# Patient Record
Sex: MALE | Race: WHITE | Employment: UNEMPLOYED | URBAN - METROPOLITAN AREA
[De-identification: names, ages, dates, MRNs, and addresses within clinical notes are randomized per-mention and may not be internally consistent; named-entity substitution may affect disease eponyms.]

---

## 2020-06-23 ENCOUNTER — HOSPITAL ENCOUNTER (EMERGENCY)
Age: 25
Discharge: HOME | End: 2020-06-23
Attending: EMERGENCY MEDICINE

## 2020-06-23 ENCOUNTER — APPOINTMENT (OUTPATIENT)
Dept: GENERAL RADIOLOGY | Age: 25
End: 2020-06-23

## 2020-06-23 VITALS
BODY MASS INDEX: 28.25 KG/M2 | SYSTOLIC BLOOD PRESSURE: 161 MMHG | HEIGHT: 67 IN | OXYGEN SATURATION: 99 % | RESPIRATION RATE: 18 BRPM | DIASTOLIC BLOOD PRESSURE: 95 MMHG | HEART RATE: 82 BPM | TEMPERATURE: 98 F | WEIGHT: 180 LBS

## 2020-06-23 PROCEDURE — 99283 EMERGENCY DEPT VISIT LOW MDM: CPT

## 2020-06-23 PROCEDURE — 73030 X-RAY EXAM OF SHOULDER: CPT

## 2020-06-23 PROCEDURE — 73000 X-RAY EXAM OF COLLAR BONE: CPT

## 2020-06-23 PROCEDURE — 6370000000 HC RX 637 (ALT 250 FOR IP): Performed by: STUDENT IN AN ORGANIZED HEALTH CARE EDUCATION/TRAINING PROGRAM

## 2020-06-23 RX ORDER — OXYCODONE HYDROCHLORIDE 5 MG/1
10 TABLET ORAL ONCE
Status: COMPLETED | OUTPATIENT
Start: 2020-06-23 | End: 2020-06-23

## 2020-06-23 RX ORDER — IBUPROFEN 800 MG/1
800 TABLET ORAL EVERY 8 HOURS PRN
Qty: 30 TABLET | Refills: 0 | Status: SHIPPED | OUTPATIENT
Start: 2020-06-23

## 2020-06-23 RX ORDER — IBUPROFEN 800 MG/1
800 TABLET ORAL ONCE
Status: COMPLETED | OUTPATIENT
Start: 2020-06-23 | End: 2020-06-23

## 2020-06-23 RX ADMIN — OXYCODONE HYDROCHLORIDE 10 MG: 5 TABLET ORAL at 21:29

## 2020-06-23 RX ADMIN — IBUPROFEN 800 MG: 800 TABLET, FILM COATED ORAL at 21:29

## 2020-06-23 ASSESSMENT — PAIN DESCRIPTION - LOCATION
LOCATION: SHOULDER
LOCATION: SHOULDER

## 2020-06-23 ASSESSMENT — ENCOUNTER SYMPTOMS
SHORTNESS OF BREATH: 0
BACK PAIN: 0
NAUSEA: 0
ABDOMINAL PAIN: 0
EYE PAIN: 0
SORE THROAT: 0
VOMITING: 0

## 2020-06-23 ASSESSMENT — PAIN DESCRIPTION - PAIN TYPE
TYPE: ACUTE PAIN
TYPE: ACUTE PAIN

## 2020-06-23 ASSESSMENT — PAIN SCALES - GENERAL
PAINLEVEL_OUTOF10: 7
PAINLEVEL_OUTOF10: 10
PAINLEVEL_OUTOF10: 7

## 2020-06-23 ASSESSMENT — PAIN DESCRIPTION - ORIENTATION
ORIENTATION: LEFT
ORIENTATION: LEFT

## 2020-06-23 ASSESSMENT — PAIN DESCRIPTION - PROGRESSION
CLINICAL_PROGRESSION: GRADUALLY WORSENING
CLINICAL_PROGRESSION: NOT CHANGED

## 2020-06-23 ASSESSMENT — PAIN DESCRIPTION - DESCRIPTORS: DESCRIPTORS: ACHING;SHARP

## 2020-06-23 ASSESSMENT — PAIN DESCRIPTION - ONSET: ONSET: SUDDEN

## 2020-06-23 ASSESSMENT — PAIN DESCRIPTION - FREQUENCY: FREQUENCY: CONTINUOUS

## 2020-06-24 NOTE — ED TRIAGE NOTES
Pt to ER with left shoulder pain and injury s/p fall. Pt states that he tripped over his dog and fell down the steps, -LOC , denies head and neck pain.  Pt is alert and oriented x 4

## 2020-06-24 NOTE — ED PROVIDER NOTES
IMAGING / EKG):  Orders Placed This Encounter   Procedures    XR SHOULDER LEFT (MIN 2 VIEWS)    XR CLAVICLE LEFT    Katrinaing and Keilae    Inpatient consult to Orthopedic Surgery    [address] Manjeet and Ina, Left       MEDICATIONS ORDERED:  Orders Placed This Encounter   Medications    oxyCODONE (ROXICODONE) immediate release tablet 10 mg    ibuprofen (ADVIL;MOTRIN) tablet 800 mg    ibuprofen (ADVIL;MOTRIN) 800 MG tablet     Sig: Take 1 tablet by mouth every 8 hours as needed for Pain     Dispense:  30 tablet     Refill:  0           DIAGNOSTIC RESULTS / EMERGENCY DEPARTMENT COURSE / MDM     LABS:  No results found for this visit on 06/23/20. IMPRESSION/MDM/ED COURSE:  31 y.o. male presented with pain over left clavicle after a dog tripped him and he fell down stairs laying on his left shoulder. Obvious deformity, swelling, ecchymosis to mid left clavicle. Neurovascular intact to upper extremities, vital signs normal.  Likely clavicle fracture, will give pain medications, x-ray. No signs to suggest neuro or vascular injury. Patient/Guardian requesting discharge. Patient/Guardian was given written and verbal instructions prior to discharge. Patient/Guardian understood and agreed. Patient/Guardian had no further questions. RADIOLOGY:  XR SHOULDER LEFT (MIN 2 VIEWS)   Final Result   1. Displaced midshaft left clavicular fracture with overriding of the   fracture fragments. 2. Borderline widening of the left acromioclavicular joint raises concern for   acromioclavicular ligament injury. XR CLAVICLE LEFT   Final Result   1. Displaced midshaft left clavicular fracture with overriding of the   fracture fragments. 2. Borderline widening of the left acromioclavicular joint raises concern for   acromioclavicular ligament injury.                EKG  None    All EKG's are interpreted by the Emergency Department Physician who either signs or Co-signs this chart in the

## 2020-06-25 ENCOUNTER — OFFICE VISIT (OUTPATIENT)
Dept: ORTHOPEDIC SURGERY | Age: 25
End: 2020-06-25

## 2020-06-25 VITALS — HEIGHT: 67 IN | WEIGHT: 165 LBS | BODY MASS INDEX: 25.9 KG/M2

## 2020-06-25 PROCEDURE — 99203 OFFICE O/P NEW LOW 30 MIN: CPT | Performed by: STUDENT IN AN ORGANIZED HEALTH CARE EDUCATION/TRAINING PROGRAM

## 2020-07-02 ENCOUNTER — OFFICE VISIT (OUTPATIENT)
Dept: ORTHOPEDIC SURGERY | Age: 25
End: 2020-07-02

## 2020-07-02 VITALS — BODY MASS INDEX: 25.9 KG/M2 | HEIGHT: 67 IN | WEIGHT: 165 LBS

## 2020-07-02 PROCEDURE — 99212 OFFICE O/P EST SF 10 MIN: CPT | Performed by: STUDENT IN AN ORGANIZED HEALTH CARE EDUCATION/TRAINING PROGRAM

## 2020-07-02 NOTE — PROGRESS NOTES
[facility]  [facility]  [address] [facility] [address]  Dept: [phone]    Ambulatory Orthopedic Office Visit      CHIEF COMPLAINT:    Chief Complaint   Patient presents with    Follow-up     [facility] ER f/u 06/23/20, clavicle  fx     HISTORY OF PRESENT ILLNESS:    The patient is a 31 y.o. male who is being after tripping over his dog down the stairs. Was initially seen in ED on 6/23/20. Last seen on 6/25/20 and non operative management was trialed, patient understood that at that time if fracture displaced he would need further surgery. Presents today for repeat x-rays and treatment options. Has maintained sling. No new injuries. Past Medical History:    History reviewed. No pertinent past medical history. Past Surgical History:    History reviewed. No pertinent surgical history. Current Medications:   Current Outpatient Medications   Medication Sig Dispense Refill    ibuprofen (ADVIL;MOTRIN) 800 MG tablet Take 1 tablet by mouth every 8 hours as needed for Pain 30 tablet 0     No current facility-administered medications for this visit. Allergies:    Amoxicillin  Social History:   Social History     Socioeconomic History    Marital status: Single     Spouse name: Not on file    Number of children: Not on file    Years of education: Not on file    Highest education level: Not on file   Occupational History    Not on file   Social Needs    Financial resource strain: Not on file    Food insecurity     Worry: Not on file     Inability: Not on file    Transportation needs     Medical: Not on file     Non-medical: Not on file   Tobacco Use    Smoking status: Current Every Day Smoker     Packs/day: 1.00     Types: Cigarettes    Smokeless tobacco: Never Used   Substance and Sexual Activity    Alcohol use:  Yes     Alcohol/week: 12.0 standard drinks     Types: 12 Cans of beer per week    Drug use: Never    Sexual activity: Not on file   Lifestyle    Physical Median/Radial/Ulnar/AIN/PIN motor intact. Axillary/Median/Radial/Ulnar nerve sensation intact to light touch. 2+ rad pulse. RADIOLOGY:   History:  Left clavicle fracture    Comparison:  6/23/20    Findings:  2 views of the left clavicle demonstrates mid-distal third clavicle shaft fracture with comminution and increased inferior displacement and shortening since prior films last week     Impression:  Left clavicle fracture with comminution and increased displacement since prior films. X-ray left clavicle on 6/23/20 demonstrates mid-distal third clavicle shaft fracture with comminuted piece. Shortening and medial displacement noted. Assessment/Plan:   Lion Apodaca is a 31y.o. year old male left clavicle fracture    At this time patient is 9 days out at last visit discussed non operative care, today x-rays were taken and fracture was further displaced, meeting operative indications. Discussed surgical vs non operative management with patient. Patient would like to pursue surgery at this time. Plan for surgery on 7/7/20. Consent signed in office today. Non weight bearing, maintain sling. Ok for elbow/wrist/hand ROM. Ice as needed. Tylenol Motrin for pain. All questions answered. Follow up two weeks after surgery.    ----------------------------------------  Concetta Ochoa DO  PGY-3, Department of [facility] [address], [place]

## 2020-07-03 ENCOUNTER — HOSPITAL ENCOUNTER (OUTPATIENT)
Dept: PREADMISSION TESTING | Age: 25
Discharge: HOME | End: 2020-07-07
Payer: OTHER GOVERNMENT

## 2020-07-03 PROCEDURE — U0003 INFECTIOUS AGENT DETECTION BY NUCLEIC ACID (DNA OR RNA); SEVERE ACUTE RESPIRATORY SYNDROME CORONAVIRUS 2 (SARS-COV-2) (CORONAVIRUS DISEASE [COVID-19]), AMPLIFIED PROBE TECHNIQUE, MAKING USE OF HIGH THROUGHPUT TECHNOLOGIES AS DESCRIBED BY CMS-2020-01-R: HCPCS

## 2020-07-04 LAB
SARS-COV-2, PCR: NORMAL
SARS-COV-2, RAPID: NORMAL
SARS-COV-2: NOT DETECTED
SOURCE: NORMAL

## 2020-07-05 ENCOUNTER — TELEPHONE (OUTPATIENT)
Dept: PRIMARY CARE CLINIC | Age: 25
End: 2020-07-05

## 2020-07-07 ENCOUNTER — APPOINTMENT (OUTPATIENT)
Dept: GENERAL RADIOLOGY | Age: 25
End: 2020-07-07
Attending: ORTHOPAEDIC SURGERY

## 2020-07-07 ENCOUNTER — ANESTHESIA EVENT (OUTPATIENT)
Dept: OPERATING ROOM | Age: 25
End: 2020-07-07

## 2020-07-07 ENCOUNTER — ANESTHESIA (OUTPATIENT)
Dept: OPERATING ROOM | Age: 25
End: 2020-07-07

## 2020-07-07 ENCOUNTER — HOSPITAL ENCOUNTER (OUTPATIENT)
Age: 25
Setting detail: OUTPATIENT SURGERY
Discharge: HOME | End: 2020-07-07
Attending: ORTHOPAEDIC SURGERY | Admitting: ORTHOPAEDIC SURGERY

## 2020-07-07 VITALS — DIASTOLIC BLOOD PRESSURE: 46 MMHG | TEMPERATURE: 97 F | OXYGEN SATURATION: 96 % | SYSTOLIC BLOOD PRESSURE: 93 MMHG

## 2020-07-07 VITALS
HEIGHT: 66 IN | OXYGEN SATURATION: 98 % | WEIGHT: 165 LBS | HEART RATE: 65 BPM | RESPIRATION RATE: 17 BRPM | BODY MASS INDEX: 26.52 KG/M2 | DIASTOLIC BLOOD PRESSURE: 70 MMHG | SYSTOLIC BLOOD PRESSURE: 113 MMHG | TEMPERATURE: 97.7 F

## 2020-07-07 PROCEDURE — 7100000011 HC PHASE II RECOVERY - ADDTL 15 MIN: Performed by: ORTHOPAEDIC SURGERY

## 2020-07-07 PROCEDURE — 2500000003 HC RX 250 WO HCPCS: Performed by: SPECIALIST

## 2020-07-07 PROCEDURE — 3600000014 HC SURGERY LEVEL 4 ADDTL 15MIN: Performed by: ORTHOPAEDIC SURGERY

## 2020-07-07 PROCEDURE — 2500000003 HC RX 250 WO HCPCS: Performed by: ANESTHESIOLOGY

## 2020-07-07 PROCEDURE — 2580000003 HC RX 258: Performed by: SPECIALIST

## 2020-07-07 PROCEDURE — 7100000000 HC PACU RECOVERY - FIRST 15 MIN: Performed by: ORTHOPAEDIC SURGERY

## 2020-07-07 PROCEDURE — 6360000002 HC RX W HCPCS: Performed by: SPECIALIST

## 2020-07-07 PROCEDURE — 2709999900 HC NON-CHARGEABLE SUPPLY: Performed by: ORTHOPAEDIC SURGERY

## 2020-07-07 PROCEDURE — 3600000004 HC SURGERY LEVEL 4 BASE: Performed by: ORTHOPAEDIC SURGERY

## 2020-07-07 PROCEDURE — 23515 OPTX CLAVICULAR FX W/INT FIX: CPT | Performed by: ORTHOPAEDIC SURGERY

## 2020-07-07 PROCEDURE — 7100000010 HC PHASE II RECOVERY - FIRST 15 MIN: Performed by: ORTHOPAEDIC SURGERY

## 2020-07-07 PROCEDURE — 73000 X-RAY EXAM OF COLLAR BONE: CPT

## 2020-07-07 PROCEDURE — 6360000002 HC RX W HCPCS

## 2020-07-07 PROCEDURE — 64415 NJX AA&/STRD BRCH PLXS IMG: CPT | Performed by: ANESTHESIOLOGY

## 2020-07-07 PROCEDURE — 2580000003 HC RX 258: Performed by: ORTHOPAEDIC SURGERY

## 2020-07-07 PROCEDURE — 6370000000 HC RX 637 (ALT 250 FOR IP): Performed by: ANESTHESIOLOGY

## 2020-07-07 PROCEDURE — 2720000010 HC SURG SUPPLY STERILE: Performed by: ORTHOPAEDIC SURGERY

## 2020-07-07 PROCEDURE — 6360000002 HC RX W HCPCS: Performed by: ANESTHESIOLOGY

## 2020-07-07 PROCEDURE — 3700000000 HC ANESTHESIA ATTENDED CARE: Performed by: ORTHOPAEDIC SURGERY

## 2020-07-07 PROCEDURE — 3700000001 HC ADD 15 MINUTES (ANESTHESIA): Performed by: ORTHOPAEDIC SURGERY

## 2020-07-07 PROCEDURE — C1713 ANCHOR/SCREW BN/BN,TIS/BN: HCPCS | Performed by: ORTHOPAEDIC SURGERY

## 2020-07-07 PROCEDURE — 7100000001 HC PACU RECOVERY - ADDTL 15 MIN: Performed by: ORTHOPAEDIC SURGERY

## 2020-07-07 DEVICE — IMPLANTABLE DEVICE: Type: IMPLANTABLE DEVICE | Site: CLAVICLE | Status: FUNCTIONAL

## 2020-07-07 DEVICE — 3.0MM X 16MM NON-LOCKING HEXALOBE SCREW
Type: IMPLANTABLE DEVICE | Site: CLAVICLE | Status: FUNCTIONAL
Brand: ACUMED

## 2020-07-07 DEVICE — 2.3MM X 14MM NON-TOGGLING CORTICAL SCREW
Type: IMPLANTABLE DEVICE | Site: CLAVICLE | Status: FUNCTIONAL
Brand: ACUMED

## 2020-07-07 DEVICE — 3.5MM X 16MM NON-LOCKING HEXALOBE SCREW
Type: IMPLANTABLE DEVICE | Site: CLAVICLE | Status: FUNCTIONAL
Brand: ACUMED

## 2020-07-07 DEVICE — 3.5MM X 14MM NON-LOCKING HEXALOBE SCREW
Type: IMPLANTABLE DEVICE | Site: CLAVICLE | Status: FUNCTIONAL
Brand: ACUMED

## 2020-07-07 DEVICE — 3.5MM X 12MM NON-LOCKING HEXALOBE SCREW
Type: IMPLANTABLE DEVICE | Site: CLAVICLE | Status: FUNCTIONAL
Brand: ACUMED

## 2020-07-07 RX ORDER — CLINDAMYCIN PHOSPHATE 900 MG/50ML
INJECTION INTRAVENOUS PRN
Status: DISCONTINUED | OUTPATIENT
Start: 2020-07-07 | End: 2020-07-07 | Stop reason: SDUPTHER

## 2020-07-07 RX ORDER — FENTANYL CITRATE 50 UG/ML
INJECTION, SOLUTION INTRAMUSCULAR; INTRAVENOUS PRN
Status: DISCONTINUED | OUTPATIENT
Start: 2020-07-07 | End: 2020-07-07 | Stop reason: SDUPTHER

## 2020-07-07 RX ORDER — OXYCODONE HYDROCHLORIDE AND ACETAMINOPHEN 5; 325 MG/1; MG/1
1 TABLET ORAL EVERY 6 HOURS PRN
Qty: 28 TABLET | Refills: 0 | Status: SHIPPED | OUTPATIENT
Start: 2020-07-07 | End: 2020-07-14

## 2020-07-07 RX ORDER — PROPOFOL 10 MG/ML
INJECTION, EMULSION INTRAVENOUS PRN
Status: DISCONTINUED | OUTPATIENT
Start: 2020-07-07 | End: 2020-07-07 | Stop reason: SDUPTHER

## 2020-07-07 RX ORDER — BUPIVACAINE HYDROCHLORIDE 5 MG/ML
30 INJECTION, SOLUTION EPIDURAL; INTRACAUDAL ONCE
Status: COMPLETED | OUTPATIENT
Start: 2020-07-07 | End: 2020-07-07

## 2020-07-07 RX ORDER — ROCURONIUM BROMIDE 10 MG/ML
INJECTION, SOLUTION INTRAVENOUS PRN
Status: DISCONTINUED | OUTPATIENT
Start: 2020-07-07 | End: 2020-07-07 | Stop reason: SDUPTHER

## 2020-07-07 RX ORDER — BUPIVACAINE HYDROCHLORIDE 5 MG/ML
INJECTION, SOLUTION EPIDURAL; INTRACAUDAL
Status: COMPLETED | OUTPATIENT
Start: 2020-07-07 | End: 2020-07-07

## 2020-07-07 RX ORDER — FENTANYL CITRATE 50 UG/ML
100 INJECTION, SOLUTION INTRAMUSCULAR; INTRAVENOUS ONCE
Status: COMPLETED | OUTPATIENT
Start: 2020-07-07 | End: 2020-07-07

## 2020-07-07 RX ORDER — MIDAZOLAM HYDROCHLORIDE 1 MG/ML
2 INJECTION INTRAMUSCULAR; INTRAVENOUS ONCE
Status: COMPLETED | OUTPATIENT
Start: 2020-07-07 | End: 2020-07-07

## 2020-07-07 RX ORDER — DEXAMETHASONE SODIUM PHOSPHATE 10 MG/ML
INJECTION INTRAMUSCULAR; INTRAVENOUS PRN
Status: DISCONTINUED | OUTPATIENT
Start: 2020-07-07 | End: 2020-07-07 | Stop reason: SDUPTHER

## 2020-07-07 RX ORDER — MAGNESIUM HYDROXIDE 1200 MG/15ML
LIQUID ORAL CONTINUOUS PRN
Status: COMPLETED | OUTPATIENT
Start: 2020-07-07 | End: 2020-07-07

## 2020-07-07 RX ORDER — LIDOCAINE HYDROCHLORIDE 10 MG/ML
INJECTION, SOLUTION EPIDURAL; INFILTRATION; INTRACAUDAL; PERINEURAL PRN
Status: DISCONTINUED | OUTPATIENT
Start: 2020-07-07 | End: 2020-07-07 | Stop reason: SDUPTHER

## 2020-07-07 RX ORDER — OXYCODONE HYDROCHLORIDE AND ACETAMINOPHEN 5; 325 MG/1; MG/1
1 TABLET ORAL
Status: COMPLETED | OUTPATIENT
Start: 2020-07-07 | End: 2020-07-07

## 2020-07-07 RX ORDER — GLYCOPYRROLATE 1 MG/5 ML
SYRINGE (ML) INTRAVENOUS PRN
Status: DISCONTINUED | OUTPATIENT
Start: 2020-07-07 | End: 2020-07-07 | Stop reason: SDUPTHER

## 2020-07-07 RX ORDER — SODIUM CHLORIDE, SODIUM LACTATE, POTASSIUM CHLORIDE, CALCIUM CHLORIDE 600; 310; 30; 20 MG/100ML; MG/100ML; MG/100ML; MG/100ML
INJECTION, SOLUTION INTRAVENOUS CONTINUOUS PRN
Status: DISCONTINUED | OUTPATIENT
Start: 2020-07-07 | End: 2020-07-07 | Stop reason: SDUPTHER

## 2020-07-07 RX ORDER — NEOSTIGMINE METHYLSULFATE 5 MG/5 ML
SYRINGE (ML) INTRAVENOUS PRN
Status: DISCONTINUED | OUTPATIENT
Start: 2020-07-07 | End: 2020-07-07 | Stop reason: SDUPTHER

## 2020-07-07 RX ADMIN — Medication 0.4 MG: at 17:01

## 2020-07-07 RX ADMIN — Medication 30 ML: at 14:18

## 2020-07-07 RX ADMIN — SODIUM CHLORIDE, POTASSIUM CHLORIDE, SODIUM LACTATE AND CALCIUM CHLORIDE: 600; 310; 30; 20 INJECTION, SOLUTION INTRAVENOUS at 15:11

## 2020-07-07 RX ADMIN — DEXAMETHASONE SODIUM PHOSPHATE 10 MG: 10 INJECTION INTRAMUSCULAR; INTRAVENOUS at 15:54

## 2020-07-07 RX ADMIN — HYDROMORPHONE HYDROCHLORIDE 0.25 MG: 1 INJECTION, SOLUTION INTRAMUSCULAR; INTRAVENOUS; SUBCUTANEOUS at 17:52

## 2020-07-07 RX ADMIN — Medication 3 MG: at 17:01

## 2020-07-07 RX ADMIN — BUPIVACAINE HYDROCHLORIDE 30 ML: 5 INJECTION, SOLUTION EPIDURAL; INTRACAUDAL; PERINEURAL at 14:32

## 2020-07-07 RX ADMIN — FENTANYL CITRATE 50 MCG: 50 INJECTION INTRAMUSCULAR; INTRAVENOUS at 17:18

## 2020-07-07 RX ADMIN — OXYCODONE HYDROCHLORIDE AND ACETAMINOPHEN 1 TABLET: 5; 325 TABLET ORAL at 17:51

## 2020-07-07 RX ADMIN — LIDOCAINE HYDROCHLORIDE 50 MG: 10 INJECTION, SOLUTION EPIDURAL; INFILTRATION; INTRACAUDAL; PERINEURAL at 15:19

## 2020-07-07 RX ADMIN — PROPOFOL 250 MG: 10 INJECTION, EMULSION INTRAVENOUS at 15:19

## 2020-07-07 RX ADMIN — HYDROMORPHONE HYDROCHLORIDE 0.25 MG: 1 INJECTION, SOLUTION INTRAMUSCULAR; INTRAVENOUS; SUBCUTANEOUS at 17:57

## 2020-07-07 RX ADMIN — FENTANYL CITRATE 100 MCG: 50 INJECTION INTRAMUSCULAR; INTRAVENOUS at 14:15

## 2020-07-07 RX ADMIN — CLINDAMYCIN PHOSPHATE 900 MG: 900 INJECTION, SOLUTION INTRAVENOUS at 15:32

## 2020-07-07 RX ADMIN — MIDAZOLAM HYDROCHLORIDE 2 MG: 1 INJECTION, SOLUTION INTRAMUSCULAR; INTRAVENOUS at 14:15

## 2020-07-07 RX ADMIN — ROCURONIUM BROMIDE 40 MG: 10 INJECTION INTRAVENOUS at 15:19

## 2020-07-07 ASSESSMENT — PULMONARY FUNCTION TESTS
PIF_VALUE: 17
PIF_VALUE: 15
PIF_VALUE: 17
PIF_VALUE: 16
PIF_VALUE: 17
PIF_VALUE: 2
PIF_VALUE: 16
PIF_VALUE: 7
PIF_VALUE: 17
PIF_VALUE: 16
PIF_VALUE: 17
PIF_VALUE: 15
PIF_VALUE: 17
PIF_VALUE: 14
PIF_VALUE: 17
PIF_VALUE: 0
PIF_VALUE: 15
PIF_VALUE: 17
PIF_VALUE: 17
PIF_VALUE: 18
PIF_VALUE: 17
PIF_VALUE: 14
PIF_VALUE: 17
PIF_VALUE: 17
PIF_VALUE: 0
PIF_VALUE: 17
PIF_VALUE: 1
PIF_VALUE: 17
PIF_VALUE: 14
PIF_VALUE: 17
PIF_VALUE: 0
PIF_VALUE: 23
PIF_VALUE: 16
PIF_VALUE: 1
PIF_VALUE: 17
PIF_VALUE: 17
PIF_VALUE: 16
PIF_VALUE: 17
PIF_VALUE: 16
PIF_VALUE: 17
PIF_VALUE: 1
PIF_VALUE: 17
PIF_VALUE: 20
PIF_VALUE: 17
PIF_VALUE: 19
PIF_VALUE: 17
PIF_VALUE: 16
PIF_VALUE: 17
PIF_VALUE: 16
PIF_VALUE: 14
PIF_VALUE: 17
PIF_VALUE: 17
PIF_VALUE: 3
PIF_VALUE: 1
PIF_VALUE: 1
PIF_VALUE: 17
PIF_VALUE: 0
PIF_VALUE: 14
PIF_VALUE: 17
PIF_VALUE: 16
PIF_VALUE: 17
PIF_VALUE: 1
PIF_VALUE: 16
PIF_VALUE: 17

## 2020-07-07 ASSESSMENT — PAIN SCALES - GENERAL
PAINLEVEL_OUTOF10: 0
PAINLEVEL_OUTOF10: 5
PAINLEVEL_OUTOF10: 3
PAINLEVEL_OUTOF10: 0

## 2020-07-07 ASSESSMENT — PAIN DESCRIPTION - PAIN TYPE: TYPE: SURGICAL PAIN

## 2020-07-07 ASSESSMENT — PAIN DESCRIPTION - LOCATION: LOCATION: SHOULDER

## 2020-07-07 ASSESSMENT — LIFESTYLE VARIABLES: SMOKING_STATUS: 1

## 2020-07-07 ASSESSMENT — PAIN - FUNCTIONAL ASSESSMENT: PAIN_FUNCTIONAL_ASSESSMENT: 0-10

## 2020-07-07 NOTE — ANESTHESIA PRE PROCEDURE
Department of Anesthesiology  Preprocedure Note       Name:  Osiris Alvarado   Age:  31 y.o.  :  1995                                          MRN:  [id number]         Date:  2020      Surgeon: Francis Valadez):  Henry Daugherty DO    Procedure: Procedure(s):  CLAVICLE OPEN REDUCTION INTERNAL FIXATION  (ACCUMED CLAVICLE PLATES, INTERSCALENE BLOCK PRE-OP, 3080 TABLE, SUPINE, C-ARM)    Medications prior to admission:   Prior to Admission medications    Medication Sig Start Date End Date Taking? Authorizing Provider   ibuprofen (ADVIL;MOTRIN) 800 MG tablet Take 1 tablet by mouth every 8 hours as needed for Pain 20  Yes Martin Calloway DO       Current medications:    No current facility-administered medications for this encounter. Allergies: Allergies   Allergen Reactions    Amoxicillin Hives and Shortness Of Breath       Problem List:  There is no problem list on file for this patient. Past Medical History:        Diagnosis Date    Wellness examination     no primary care physician    Wellness examination     ortho-Dr Chen-last visit 2020       Past Surgical History:        Procedure Laterality Date    APPENDECTOMY         Social History:    Social History     Tobacco Use    Smoking status: Current Every Day Smoker     Packs/day: 1.00     Types: Cigarettes    Smokeless tobacco: Former User     Types: Chew   Substance Use Topics    Alcohol use:  Yes     Alcohol/week: 12.0 standard drinks     Types: 12 Cans of beer per week     Comment: social                                 Ready to quit: Not Answered  Counseling given: Not Answered      Vital Signs (Current):   Vitals:    20 1207 20 1353   BP:  129/79   Pulse:  74   Resp:  18   Temp:  99.1 °F (37.3 °C)   TempSrc:  Temporal   SpO2:  99%   Weight: 165 lb (74.8 kg)    Height: 5' 6\" (1.676 m)                                               BP Readings from Last 3 Encounters:   20 129/79   20 (!) 161/95       NPO Status: Time of last liquid consumption: 2300                        Time of last solid consumption: 1930                        Date of last liquid consumption: 07/06/20                        Date of last solid food consumption: 07/06/20    BMI:   Wt Readings from Last 3 Encounters:   07/06/20 165 lb (74.8 kg)   07/02/20 165 lb (74.8 kg)   06/25/20 165 lb (74.8 kg)     Body mass index is 26.63 kg/m². CBC: No results found for: WBC, RBC, HGB, HCT, MCV, RDW, PLT    CMP: No results found for: NA, K, CL, CO2, BUN, CREATININE, GFRAA, AGRATIO, LABGLOM, GLUCOSE, PROT, CALCIUM, BILITOT, ALKPHOS, AST, ALT    POC Tests: No results for input(s): POCGLU, POCNA, POCK, POCCL, POCBUN, POCHEMO, POCHCT in the last 72 hours.     Coags: No results found for: PROTIME, INR, APTT    HCG (If Applicable): No results found for: PREGTESTUR, PREGSERUM, HCG, HCGQUANT     ABGs: No results found for: PHART, PO2ART, JRT7GRK, RYD9IMD, BEART, J1DMPFGQ     Type & Screen (If Applicable):  No results found for: LABABO, LABRH    Drug/Infectious Status (If Applicable):  No results found for: HIV, HEPCAB    COVID-19 Screening (If Applicable):   Lab Results   Component Value Date    COVID19 Not Detected 07/03/2020         Anesthesia Evaluation  Patient summary reviewed and Nursing notes reviewed no history of anesthetic complications:   Airway: Mallampati: II  TM distance: >3 FB   Neck ROM: full   Dental: normal exam         Pulmonary:normal exam    (+) current smoker                           Cardiovascular:  Exercise tolerance: good (>4 METS),       (-) past MI, CAD, CABG/stent, dysrhythmias and  angina      Rhythm: regular  Rate: normal           Beta Blocker:  Not on Beta Blocker         Neuro/Psych:   Negative Neuro/Psych ROS              GI/Hepatic/Renal: Neg GI/Hepatic/Renal ROS            Endo/Other: Negative Endo/Other ROS                    Abdominal:           Vascular:                                      Anesthesia Plan      general and regional     ASA 2       Induction: intravenous. MIPS: Postoperative opioids intended and Prophylactic antiemetics administered. Anesthetic plan and risks discussed with patient.       Plan discussed with CRNA and surgical team.                  Salma Arellano MD   7/7/2020

## 2020-07-07 NOTE — PROGRESS NOTES
[id number] Dr Kay Steele to the bedside, time out performed, Pt monitored, 02, Left Interscalene single shot nerve block completed using Bupivacaine, 0.5% 30 ml  pt tolerated procedure well,  vss, Site CDI, (see charting) Versed Given:2 mg  Fentanyl  100 mcg, pt denies co pain or discomfort, family to the bedside,

## 2020-07-07 NOTE — H&P
History and Physical    Pt Name: Ariel Cortez  MRN: [id number]  YOB: 1995  Date of evaluation: 7/7/2020    SUBJECTIVE:   History of Chief Complaint:    Patient complains of clavicle fracture. He tripped over his dog and fell down stairs. He was seen in the ER on 6/23/2020, diagnosed with clavicle fracture. He has been scheduled for ORIF clavicle fracture. Past Medical History    has a past medical history of Wellness examination and Wellness examination. Past Surgical History   has a past surgical history that includes Appendectomy. Medications  Prior to Admission medications    Medication Sig Start Date End Date Taking? Authorizing Provider   ibuprofen (ADVIL;MOTRIN) 800 MG tablet Take 1 tablet by mouth every 8 hours as needed for Pain 6/23/20  Yes Manfred Foreman,      Allergies  is allergic to amoxicillin. Family History  family history includes Cancer in his father; High Blood Pressure in his mother. Social History   reports that he has been smoking cigarettes. He has been smoking about 1.00 pack per day. He has quit using smokeless tobacco.  His smokeless tobacco use included chew. reports current alcohol use of about 12.0 standard drinks of alcohol per week. reports no history of drug use. Marital Status single  Occupation none    OBJECTIVE:   VITALS:  height is 5' 6\" (1.676 m) and weight is 165 lb (74.8 kg). CONSTITUTIONAL:alert & oriented x 3, no acute distress. Pleasant. SKIN:  Warm and dry, no rashes on exposed areas of skin. HEAD:  Normocephalic, atraumatic   EYES: PERRL. EOMs intact. EARS:  Hearing grossly WNL. NOSE:  Nares patent. No rhinorrhea   MOUTH/THROAT:  benign  NECK:supple, no lymphadenopathy  LUNGS: Clear to auscultation bilaterally, no wheezes. CARDIOVASCULAR: Heart sounds are normal.  Regular rate and rhythm without murmur. ABDOMEN: soft, non tender, non distended. EXTREMITIES: no edema bilateral lower extremities.   Clavicle fracture    IMPRESSIONS: 1. Clavicle fracture  2.  has a past medical history of Wellness examination and Wellness examination.    PLANS:   1. ORIF clavicle fracture    ESTHELA SANCHES PA-C  Electronically signed 7/7/2020 at 1:53 PM

## 2020-07-07 NOTE — ANESTHESIA PROCEDURE NOTES
Peripheral Block    Patient location during procedure: pre-op  Start time: 7/7/2020 2:12 PM  End time: 7/7/2020 2:15 PM  Staffing  Anesthesiologist: Isma Tobar MD  Performed: anesthesiologist   Preanesthetic Checklist  Completed: patient identified, site marked, surgical consent, pre-op evaluation, timeout performed, IV checked, risks and benefits discussed, monitors and equipment checked, anesthesia consent given, oxygen available and patient being monitored  Peripheral Block  Patient position: sitting  Prep: ChloraPrep  Patient monitoring: cardiac monitor, continuous pulse ox, frequent blood pressure checks and IV access  Block type: Brachial plexus  Laterality: left  Injection technique: single-shot  Procedures: ultrasound guided  Local infiltration: lidocaine  Infiltration strength: 1 %  Dose: 3 mL  Interscalene  Provider prep: mask and sterile gloves  Local infiltration: lidocaine  Needle  Needle gauge: 21 G  Needle length: 10 cm  Needle localization: ultrasound guidance  Test dose: negative  Assessment  Injection assessment: negative aspiration for heme, no paresthesia on injection and local visualized surrounding nerve on ultrasound  Paresthesia pain: none  Slow fractionated injection: yes  Hemodynamics: stable  Additional Notes  Immediately prior to procedure a \"time out\" was called to verify the correct patient, allergies, laterality, procedure and equipment. Time out performed with [facility] RN    Local Anesthetic: 0.5 %  Bupivacaine   Amount: 30 ml  in 5 ml increments after negative aspiration each time.         Medications Administered  Bupivacaine (MARCAINE) PF injection 0.5%, 30 mL  Reason for block: post-op pain management and at surgeon's request

## 2020-07-08 NOTE — OP NOTE
Operative Note      Patient: Hanane Del Rio  YOB: 1995  MRN: [id number]     Date of Procedure: 7/7/2020     Pre-Op Diagnosis: LEFT CLAVICLE FRACTURE     Post-Op Diagnosis: Same       Procedure(s): LEFT CLAVICLE OPEN REDUCTION INTERNAL FIXATION      Surgeon(s): Sin Ramos DO     Assistant: Resident: Amadou Ellington DO; Augusto Lind DO     Anesthesia: General     Estimated Blood Loss (mL): 75 mL     Complications: None     Specimens: * No specimens in log *    Implants:  Implant Name Type Inv. Item Serial No.  Lot No. LRB No. Used Action   PLATE CLAVICLE SHOES STR LEFT Screw/Plate/Nail/Micah PLATE CLAVICLE SHOES STR LEFT  ACUMED  Left 1 Implanted   SCREW HEXALOBE NON LK 3.5X12MM Screw/Plate/Nail/Micah SCREW HEXALOBE NON LK 3.5X12MM  ACUMED  Left 2 Implanted   SCREW HEXALOBE NON LK 3.5X14MM Screw/Plate/Nail/Micah SCREW HEXALOBE NON LK 3.5X14MM  ACUMED  Left 2 Implanted   SCREW HEXALOBE NON LK 3.5X16MM Screw/Plate/Nail/Micah SCREW HEXALOBE NON LK 3.5X16MM  ACUMED  Left 1 Implanted   SCREW HEXALOBE NON-LK 3.0X16MM Screw/Plate/Nail/Micah SCREW HEXALOBE NON-LK 3.0X16MM  ACUMED  Left 1 Implanted   SCREW NON TOGGLING Screw/Plate/Nail/Micah SCREW NON TOGGLING  ACUMED  Left 2 Implanted         Drains: * No LDAs found *    Findings: Left clavicle fracture    Detailed Description of Procedure: Indications: Patient is a 31year old male who initially attempted non operative management of left clavicle fracture. Subsequent office visits demonstrated further displacement meeting operative indications. Surgical intervention was recommended to stabilize the fracture and optimize patient recovery.  Risks and benefits were discussed with patient and family including: bleeding, blood clots, infection, wound complications, malunion, nonunion, hardware failure, permanent numbness damage to surrounding structures including vessels and nerves, need for further/emergent surgery, residual pain, anesthesia risks, loss of limb/life. Patient understood the risks and benefits and decided to proceed with surgery. On the day of surgery patient written consent was reviewed and signed. Patients operative site was marked with permanent marker. Patient was wheeled back to the operative site and underwent general anesthesia and endotracheal intubation. Patient was centered on the 3080 table. Pre-operative antibiotics 2 grams of Ancef were given. A timeout with all team members present in the room was held with agreement of the correct patient, operative site, and plan. Patients operative left upper extremity to midline chest was prepped in a sterile fashion. Appropriate sterile drapes were placed. Operative landmarks were marked with a sterile marker. C-arm was used for provisional assistance marking fracture site, SC/AC joints. A 10 cm incision with #10 blade was made over superior aspect of clavicle centered over fracture. Subcutaneous tissue was dissected with bovie cauterie. Platysma dissected with a full thickness flap down to bone Full flaps were able to be made and retracted to both sides of bone. Fracture site was visualized. Fracture hematoma cleared irrigated and curetted to visualize fracture ends. Two large pieces of comminution noted. One piece had minimal soft tissue attachments and was removed. A second large piece anteriorly was noted to have good soft tissue attachments and anatomical fracture ends that were then reduced and provisionally reduced with lobster claws and pointed reduction clamps. Two interfragmentary lag screws were placed horizontal to the fracture line to hold the butterfly fragment in place. Lag by technique performed with overdrilling near cortex and underdrilling far cortex. Screws measured and placed, butterfly fragment remained reduced and stable. Next the two main ends of the fracture were reduced and keyed anatomically through visualization as well as noted on c-arm.  The left superior Acumed clavicle plate was then used. Provisional fixation and plate position was performed. The midline screw was placed first and then a second lateral screw was placed. C-arm visualized reduced fracture and appropriate plate position. 3.5 bicortical screws used on both sides of fracture for a total of 6 cortices on both sides utilized. One of the lateral screws was a 3.0 screw in an oblong hole due to the proximity of butterfly fragment split. After all screws placed, final c-arm images taken with good alignment of the fracture and placement of implants. Copious irrigation performed. 0-Vicyl used to approximate deep fascia layer and to cover entire plate. 2-0 Vicyl used to approximate skin. 2-0 Stratafix was then used to reinforce subcutaneous layer. Incision washed and dried. Dermabond applied. Optifoam dressing placed. Sling placed. Patient non weight bearing. Follow up in office in 10-14 days. Patient tolerated procedure well. No complications were encountered. EBL's estimated at 75 mL. Patient was extubated, remained stable and was brought to the PACU for recovery. Dr. Yaritza Ingram was present for all aspects of procedure.       Electronically signed by Brittany Machado DO on 7/8/2020 at 11:03 AM

## 2020-07-30 ENCOUNTER — OFFICE VISIT (OUTPATIENT)
Dept: ORTHOPEDIC SURGERY | Age: 25
End: 2020-07-30

## 2020-07-30 VITALS — HEIGHT: 66 IN | WEIGHT: 173.4 LBS | BODY MASS INDEX: 27.87 KG/M2

## 2020-07-30 PROCEDURE — 99024 POSTOP FOLLOW-UP VISIT: CPT | Performed by: STUDENT IN AN ORGANIZED HEALTH CARE EDUCATION/TRAINING PROGRAM

## 2020-07-30 NOTE — PROGRESS NOTES
[facility]  [facility]  [address]  Dept: [phone]  Dept Fax: [phone]        Postoperative Follow-Up Note    Subjective:   Kobe Atkinson is a 31y.o. year old male who presents to our office today for postoperative followup regarding:  his   1. Displaced fracture of shaft of left clavicle, subsequent encounter for fracture with routine healing    . HPI  Mr. Susanna Joseph presents to clinic today for routine postoperative follow-up regarding left clavicle shaft fracture status post ORIF, date of surgery 7/7/2020. He states that he has been compliant with sling wear and no lifting, pushing, or pulling with the left upper extremity or overhead range of motion. He denies any significant numbness or tingling in the affected extremity or redness, drainage, or other signs of infection to the incision site. As any fever chills or other signs concerning for infection. He states overall his pain is well controlled and has not been taking any narcotics. He requests a release to return to work with light duty if possible today. Review of Systems   Constitutional: Negative for fever and chills. HENT: Negative for congestion. Eyes: Negative for blurred vision and double vision. Respiratory: Negative for cough, shortness of breath and wheezing. Cardiovascular: Negative for chest pain and palpitations. Gastrointestinal: Negative for nausea. Negative for vomiting. Musculoskeletal: Positive for myalgias and left chest wall/shoulder pain. Skin: Negative for itching and rash. Neurological: Negative for dizziness, sensory change and headaches. Psychiatric/Behavioral: Negative for depression and suicidal ideas. I have reviewed the CC, HPI, ROS, PMH, FHX, and social history.  I agree with the documentation provided by other staff and/or medical students and have reviewed their documentation prior to providing my signature indicating agreement. Objective :   Ht 5' 6\" (1.676 m)   Wt 173 lb 6.4 oz (78.7 kg)   BMI 27.99 kg/m²   General: AAOx3, sitting comfortably in exam room  Ortho Exam  MSK-LUE: Well-healing incision to mid clavicle with no significant erythema, calor, or soft tissue swelling. Some residual Dermabond adhesive is noted. Minimal tenderness to palpation noted. Hand warm and perfused w/ BCR; 2+ radial and ulnar pulses. Median/Radial/Ulnar/AIN/PIN gross motor intact. Mild shoulder and anterior chest wall pain with forward elevation and abduction. Axillary/Median/Radial/Ulnar nerve SILT. Pulm: respirations unlabored and regular, no audible wheezing. Skin: warm, well perfused  Psych: patient has good fund of knowledge and displays understanging of exam, diagnosis, and plan. Radiology:  History:   Left midshaft clavicle fracture status post ORIF, date of surgery 7/7/2020    Findings:   Views: 2V Left Clavicle  Findings: AP and upshot/cephalic view of the left clavicle obtained in office today reviewed and remarkable for maintained anatomic alignment of fracture in comparison to intraoperative and initial postoperative radiographs, orthopedic construct remains well aligned and intact with no signs of any interval complication. Previous comparison films: July 7, 2020    Impression:  Stable and healing left clavicle fracture status post ORIF    Assessment:      1. Displaced fracture of shaft of left clavicle, subsequent encounter for fracture with routine healing       Plan:      -Reviewed x-ray findings extensively with the patient in office today. Given the maintained alignment and good healing of the incision plan at this time will be to transition to range of motion as tolerated for the left shoulder, but to still refrain from any lifting pushing or pulling with the left upper extremity that would exceed 5 pounds.   -May continue to use sling as needed, but encouraged if used to remove multiple times daily to work on elbow wrist and finger range of motion. May wean from the sling as tolerated. -Return to work letter given today with restrictions of no lifting to exceed 5 pounds.  -Plan at this point will be for follow-up in 4 weeks time at which point patient be released back to full duty with regards to work and no significant restrictions with regard to the left upper extremity. Discussed with the patient if there is a lack of return of motion or strength may consider physical therapy at that time.    -Patient expressed understanding of and agreement to the treatment plan and knows to call in the interim with any significant changes questions or concerns. Follow up:Return in about 4 weeks (around 8/27/2020). No orders of the defined types were placed in this encounter. No orders of the defined types were placed in this encounter.      Electronically signed by Annmarie Estes DO on 7/30/2020 at 7:46 PM

## 2020-07-30 NOTE — LETTER
[facility]  [address] [address] [facility]  Phone: [phone]  Fax: [phone] [facility], DO        July 30, 2020     Patient: Robert Crowley   YOB: 1995   Date of Visit: 7/30/2020       To Whom It May Concern: It is my medical opinion that Robert Crowley may return to work on Monday 8/3/2020 with the following restrictions: lifting/carrying not to exceed 5 lbs. , pushing/pulling not to exceed 5 lbs. If you have any questions or concerns, please don't hesitate to call.     Sincerely,        Jeet Flores, DO

## 2020-08-20 ENCOUNTER — OFFICE VISIT (OUTPATIENT)
Dept: ORTHOPEDIC SURGERY | Age: 25
End: 2020-08-20

## 2020-08-20 PROCEDURE — 99024 POSTOP FOLLOW-UP VISIT: CPT | Performed by: STUDENT IN AN ORGANIZED HEALTH CARE EDUCATION/TRAINING PROGRAM

## 2020-08-20 NOTE — LETTER
[facility]  [address] [place] [address]  Phone: [phone]  Fax: [phone]         August 20, 2020     Patient: Sharon Guzman   YOB: 1995   Date of Visit: 8/20/2020       To Whom It May Concern:    Sharon Guzman was seen and treated in our emergency department on 8/20/2020. The following work duties are recommended. He may return to light duty immediately with the following restrictions: Weight restrictions to personal tolerance    Treatment and work recommendations given by the emergency department are initial emergency measures only, and follow up should be arranged as soon as possible with the company's occupational health provider or the specialist to whom the worker was referred. *If the company does not have an occupational health provider, follow up should be at No follow-up provider specified.         Sincerely,        Kristopher Rothman DO        Signature:__________________________________

## 2020-08-20 NOTE — PROGRESS NOTES
palpation around the shoulder joint. AIN/PIN/radial/median/ulnar nerve motor grossly intact. Sensation grossly intact throughout the hand. Radial pulse 2+. Radiology:   History:   Left clavicle fracture status post open reduction internal fixation on 8/7/2020    Findings:   Views: 2 views left clavicle  Findings: Demonstrate well aligned midshaft clavicle fracture with orthopedic hardware in place. No evidence of hardware loosening. No fracture displacement from previous films. There does appear to be adequate bone consolidation at the fracture site though the fracture line is still mildly visible. No other lytic or blastic lesions present. Normal joint alignment. Previous comparison films: Compared to previous films on 8/7/2020. Unchanged alignment. Impression:  Left clavicle fracture status post open reduction internal fixation, routine healing. Assessment:      1. Displaced fracture of shaft of left clavicle, subsequent encounter for fracture with routine healing         Plan:   Patient is doing well postoperatively for his left clavicle fracture though I am concerned about his range of motion. He has not been working with physical therapy due to lack of insurance coverage. We would like him to fully work on overhead range of motion. We do not feel he needs to be restricted as far as weightbearing to that shoulder. He is okay to advance weightbearing as tolerated that side for work. Weight restrictions to patient tolerance. He was taught some overhead shoulder exercises today in clinic. We want him to work on these daily.   Follow-up in 4 weeks for repeat clinical exam, if he has continued decreased range of motion at time we may need to move forward with formal physical therapy.      --------------------------------------------------------------  Brittney Lyons DO, PGY-4  [facility]) Orthopedic Surgery   11:07 AM 08/20/20      Follow up:Return in about 4 weeks (around 9/17/2020). No orders of the defined types were placed in this encounter. No orders of the defined types were placed in this encounter. Please excuse any typos/errors, as this note was created with the assistance of voice recognition software. While intending to generate a document that actually reflects the content of the visit, the document can still have some errors including those of syntax and sound-a-like substitutions which may escape proof reading. In such instances, actual meaning can be extrapolated by context.

## 2024-12-14 ENCOUNTER — APPOINTMENT (OUTPATIENT)
Dept: GENERAL RADIOLOGY | Age: 29
End: 2024-12-14

## 2024-12-14 ENCOUNTER — HOSPITAL ENCOUNTER (EMERGENCY)
Age: 29
Discharge: HOME | End: 2024-12-14
Attending: EMERGENCY MEDICINE

## 2024-12-14 VITALS
BODY MASS INDEX: 30.53 KG/M2 | RESPIRATION RATE: 16 BRPM | TEMPERATURE: 98.6 F | WEIGHT: 190 LBS | SYSTOLIC BLOOD PRESSURE: 167 MMHG | HEIGHT: 66 IN | DIASTOLIC BLOOD PRESSURE: 104 MMHG | HEART RATE: 84 BPM | OXYGEN SATURATION: 100 %

## 2024-12-14 DIAGNOSIS — T14.8XXA MUSCLE STRAIN: Primary | ICD-10-CM

## 2024-12-14 LAB
BACTERIA URNS QL MICRO: ABNORMAL
BILIRUB UR QL STRIP: NEGATIVE
CASTS #/AREA URNS LPF: ABNORMAL /LPF (ref 0–8)
CLARITY UR: CLEAR
COLOR UR: ABNORMAL
EPI CELLS #/AREA URNS HPF: ABNORMAL /HPF (ref 0–5)
GLUCOSE UR STRIP-MCNC: NEGATIVE MG/DL
HGB UR QL STRIP.AUTO: NEGATIVE
KETONES UR STRIP-MCNC: NEGATIVE MG/DL
LEUKOCYTE ESTERASE UR QL STRIP: NEGATIVE
NITRITE UR QL STRIP: NEGATIVE
PH UR STRIP: 6 [PH] (ref 5–8)
PROT UR STRIP-MCNC: NEGATIVE MG/DL
RBC #/AREA URNS HPF: ABNORMAL /HPF (ref 0–4)
SP GR UR STRIP: 1.03 (ref 1–1.03)
UROBILINOGEN UR STRIP-ACNC: NORMAL EU/DL (ref 0–1)
WBC #/AREA URNS HPF: ABNORMAL /HPF (ref 0–5)

## 2024-12-14 PROCEDURE — 71046 X-RAY EXAM CHEST 2 VIEWS: CPT

## 2024-12-14 PROCEDURE — 6360000002 HC RX W HCPCS

## 2024-12-14 PROCEDURE — 6370000000 HC RX 637 (ALT 250 FOR IP)

## 2024-12-14 PROCEDURE — 99284 EMERGENCY DEPT VISIT MOD MDM: CPT

## 2024-12-14 PROCEDURE — 96372 THER/PROPH/DIAG INJ SC/IM: CPT

## 2024-12-14 PROCEDURE — 81001 URINALYSIS AUTO W/SCOPE: CPT

## 2024-12-14 RX ORDER — CYCLOBENZAPRINE HCL 10 MG
10 TABLET ORAL 3 TIMES DAILY PRN
Qty: 21 TABLET | Refills: 0 | Status: SHIPPED | OUTPATIENT
Start: 2024-12-14 | End: 2024-12-24

## 2024-12-14 RX ORDER — LIDOCAINE 4 G/G
1 PATCH TOPICAL ONCE
Status: DISCONTINUED | OUTPATIENT
Start: 2024-12-14 | End: 2024-12-14 | Stop reason: HOSPADM

## 2024-12-14 RX ORDER — KETOROLAC TROMETHAMINE 30 MG/ML
30 INJECTION, SOLUTION INTRAMUSCULAR; INTRAVENOUS ONCE
Status: COMPLETED | OUTPATIENT
Start: 2024-12-14 | End: 2024-12-14

## 2024-12-14 RX ORDER — CYCLOBENZAPRINE HCL 10 MG
10 TABLET ORAL ONCE
Status: COMPLETED | OUTPATIENT
Start: 2024-12-14 | End: 2024-12-14

## 2024-12-14 RX ADMIN — KETOROLAC TROMETHAMINE 30 MG: 30 INJECTION, SOLUTION INTRAMUSCULAR; INTRAVENOUS at 18:18

## 2024-12-14 RX ADMIN — CYCLOBENZAPRINE 10 MG: 10 TABLET, FILM COATED ORAL at 18:18

## 2024-12-14 ASSESSMENT — PAIN - FUNCTIONAL ASSESSMENT: PAIN_FUNCTIONAL_ASSESSMENT: 0-10

## 2024-12-14 ASSESSMENT — PAIN SCALES - GENERAL: PAINLEVEL_OUTOF10: 3

## 2024-12-14 NOTE — ED PROVIDER NOTES
[facility] EMERGENCY DEPARTMENT  Emergency Department Encounter  Emergency Medicine Resident     Pt Name:John Viera  MRN: [id number]  Birthdate 1995  Date of evaluation: 12/14/24  PCP:  No primary care provider on file.  Note Started: 6:08 PM EST      CHIEF COMPLAINT       Chief Complaint   Patient presents with    Rib Pain (injury)    Back Pain     L.       HISTORY OF PRESENT ILLNESS  (Location/Symptom, Timing/Onset, Context/Setting, Quality, Duration, Modifying Factors, Severity.)      John Viera is a 29 y.o. male who presents with left posterior lower chest wall pain after sustaining a fall approximately 1 week prior.  Patient states he had a mechanical fall when he tripped over his dog and fell on the stairs, landing on his left side.  He denies loss of consciousness, did not hit his head, is not on anticoagulation.  He has had some relief of his symptoms of lidocaine patches at home.  Earlier today patient was stretching when he heard a \"pop\" and began experiencing worsening of his pain.  He denies associated shortness of breath, diaphoresis, chest pain or palpitations.    PAST MEDICAL / SURGICAL / SOCIAL / FAMILY HISTORY      has a past medical history of Wellness examination and Wellness examination.       has a past surgical history that includes Appendectomy; Clavicle surgery (Left, 07/07/2020); and Clavicle surgery (Left, 7/7/2020).      Social History     Socioeconomic History    Marital status: Single     Spouse name: Not on file    Number of children: Not on file    Years of education: Not on file    Highest education level: Not on file   Occupational History    Not on file   Tobacco Use    Smoking status: Every Day     Current packs/day: 1.00     Types: Cigarettes    Smokeless tobacco: Former     Types: Chew   Vaping Use    Vaping status: Never Used   Substance and Sexual Activity    Alcohol use: Yes     Alcohol/week: 12.0 standard drinks of alcohol     Types: 12 Cans of beer per week      Comment: social     Drug use: Never    Sexual activity: Not on file   Other Topics Concern    Not on file   Social History Narrative    Not on file     Social Determinants of Health     Financial Resource Strain: Not on file   Food Insecurity: Not on file   Transportation Needs: Not on file   Physical Activity: Not on file   Stress: Not on file   Social Connections: Not on file   Intimate Partner Violence: Not on file   Housing Stability: Not on file       Family History   Problem Relation Age of Onset    High Blood Pressure Mother     Cancer Father        Allergies:  Amoxicillin    Home Medications:  Prior to Admission medications    Medication Sig Start Date End Date Taking? Authorizing Provider   cyclobenzaprine (FLEXERIL) 10 MG tablet Take 1 tablet by mouth 3 times daily as needed for Muscle spasms 12/14/24 12/24/24 Yes Justin Mota MD   ibuprofen (ADVIL;MOTRIN) 800 MG tablet Take 1 tablet by mouth every 8 hours as needed for Pain  Patient not taking: Reported on 7/30/2020 6/23/20   Manfred Foreman,        REVIEW OF SYSTEMS       See HPI    PHYSICAL EXAM      INITIAL VITALS:   BP (!) 167/104   Pulse 84   Temp 98.6 °F (37 °C) (Oral)   Resp 16   Ht 1.676 m (5' 6\")   Wt 86.2 kg (190 lb)   SpO2 100%   BMI 30.67 kg/m²     Physical Exam  HENT:      Head: Normocephalic and atraumatic.      Mouth/Throat:      Mouth: Mucous membranes are moist.      Pharynx: Oropharynx is clear.   Eyes:      Extraocular Movements: Extraocular movements intact.      Pupils: Pupils are equal, round, and reactive to light.   Cardiovascular:      Rate and Rhythm: Normal rate.   Pulmonary:      Effort: Pulmonary effort is normal.      Comments: Lung sounds clear and equal bilaterally in all lung fields.  No rhonchi, wheezing or rales  Abdominal:      Palpations: Abdomen is soft.   Musculoskeletal:      Cervical back: Normal range of motion and neck supple.      Comments: Tenderness on palpation to the posterolateral left

## 2024-12-14 NOTE — ED PROVIDER NOTES
[facility]     Emergency Department     Faculty Note/ Attestation      Pt Name: John Viera                                       MRN: [id number]  Birthdate 1995  Date of evaluation: 12/14/2024  Note Started: 6:12 PM EST    Patients PCP:    No primary care provider on file.    Attestation  I performed a history and physical examination of the patient and discussed management with the resident. I reviewed the resident’s note and agree with the documented findings and plan of care. Any areas of disagreement are noted on the chart. I was personally present for the key portions of any procedures. I have documented in the chart those procedures where I was not present during the key portions. I have reviewed the emergency nurses triage note. I agree with the chief complaint, past medical history, past surgical history, allergies, medications, social and family history as documented unless otherwise noted below.    For Physician Assistant/ Nurse Practitioner cases/documentation I have personally evaluated this patient and have completed at least one if not all key elements of the E/M (history, physical exam, and MDM). Additional findings are as noted.    Initial Screens:        Gerri Coma Scale  Eye Opening: Spontaneous  Best Verbal Response: Oriented  Best Motor Response: Obeys commands  Remer Coma Scale Score: 15    Vitals:    Vitals:    12/14/24 1752 12/14/24 1753   BP: (!) 167/104    Pulse: 84    Resp: 16    Temp: 98.6 °F (37 °C)    TempSrc: Oral    SpO2: 100%    Weight:  86.2 kg (190 lb)   Height:  1.676 m (5' 6\")       CHIEF COMPLAINT       Chief Complaint   Patient presents with   • Rib Pain (injury)   • Back Pain     L.     Patient is a 29-year-old male who fell while taking out the dog about a week ago onto the left lower ribs patient is been having pain in that area that is increasing patient moved a little today and it suddenly became even worse no fevers no chills no pain

## 2024-12-15 NOTE — DISCHARGE INSTRUCTIONS
X-ray did not show any fractures/breaks.  Your injury is likely related to a muscle strain.  Recommend taking ibuprofen for pain, follow dosing instructions on back of bottle.  You may also apply a lidocaine patch once daily to the affected area.  You have been prescribed a muscle relaxer, take as needed.  Recommend taking muscle relaxer prior to going to sleep as it may make you drowsy.  Please do not operate a motor vehicle or heavy machinery while under the influence of this medication

## 2024-12-15 NOTE — ED NOTES
Pt presents to the ER via triage ambulatory. Pt states he fell a week ago d/t his dog wrapping the leash around him and running away landing on his left side. Pt states the pain hasn't gone away and hurts to walk and take a deep breath. Pt othrwise A&O x4, in NAD. Pt states the pain is on his left side wrapping from his ribs to his back. Call light within reach.

## 2025-05-28 ENCOUNTER — APPOINTMENT (OUTPATIENT)
Dept: GENERAL RADIOLOGY | Age: 30
End: 2025-05-28
Payer: COMMERCIAL

## 2025-05-28 ENCOUNTER — HOSPITAL ENCOUNTER (EMERGENCY)
Age: 30
Discharge: HOME | End: 2025-05-28
Attending: EMERGENCY MEDICINE
Payer: COMMERCIAL

## 2025-05-28 VITALS
SYSTOLIC BLOOD PRESSURE: 174 MMHG | DIASTOLIC BLOOD PRESSURE: 99 MMHG | BODY MASS INDEX: 30.53 KG/M2 | HEIGHT: 66 IN | WEIGHT: 190 LBS | RESPIRATION RATE: 16 BRPM | TEMPERATURE: 97.9 F | OXYGEN SATURATION: 96 % | HEART RATE: 95 BPM

## 2025-05-28 DIAGNOSIS — R42 DIZZINESS: Primary | ICD-10-CM

## 2025-05-28 LAB
ANION GAP SERPL CALCULATED.3IONS-SCNC: 15 MMOL/L (ref 9–16)
BASOPHILS # BLD: 0.08 K/UL (ref 0–0.2)
BASOPHILS NFR BLD: 1 % (ref 0–2)
BUN SERPL-MCNC: 10 MG/DL (ref 6–20)
CALCIUM SERPL-MCNC: 9.6 MG/DL (ref 8.6–10.4)
CHLORIDE SERPL-SCNC: 100 MMOL/L (ref 98–107)
CO2 SERPL-SCNC: 23 MMOL/L (ref 20–31)
CREAT SERPL-MCNC: 0.8 MG/DL (ref 0.7–1.2)
EOSINOPHIL # BLD: 0.04 K/UL (ref 0–0.44)
EOSINOPHILS RELATIVE PERCENT: 0 % (ref 1–4)
ERYTHROCYTE [DISTWIDTH] IN BLOOD BY AUTOMATED COUNT: 11.9 % (ref 11.8–14.4)
GFR, ESTIMATED: >90 ML/MIN/1.73M2
GLUCOSE SERPL-MCNC: 85 MG/DL (ref 74–99)
HCT VFR BLD AUTO: 44 % (ref 40.7–50.3)
HGB BLD-MCNC: 15.9 G/DL (ref 13–17)
IMM GRANULOCYTES # BLD AUTO: 0.03 K/UL (ref 0–0.3)
IMM GRANULOCYTES NFR BLD: 0 %
LYMPHOCYTES NFR BLD: 1.38 K/UL (ref 1.1–3.7)
LYMPHOCYTES RELATIVE PERCENT: 13 % (ref 24–43)
MAGNESIUM SERPL-MCNC: 2.2 MG/DL (ref 1.6–2.6)
MCH RBC QN AUTO: 34.5 PG (ref 25.2–33.5)
MCHC RBC AUTO-ENTMCNC: 36.1 G/DL (ref 28.4–34.8)
MCV RBC AUTO: 95.4 FL (ref 82.6–102.9)
MONOCYTES NFR BLD: 0.92 K/UL (ref 0.1–1.2)
MONOCYTES NFR BLD: 9 % (ref 3–12)
NEUTROPHILS NFR BLD: 77 % (ref 36–65)
NEUTS SEG NFR BLD: 7.92 K/UL (ref 1.5–8.1)
NRBC BLD-RTO: 0 PER 100 WBC
PLATELET # BLD AUTO: 282 K/UL (ref 138–453)
PMV BLD AUTO: 8.8 FL (ref 8.1–13.5)
POTASSIUM SERPL-SCNC: 4.2 MMOL/L (ref 3.7–5.3)
RBC # BLD AUTO: 4.61 M/UL (ref 4.21–5.77)
SODIUM SERPL-SCNC: 139 MMOL/L (ref 136–145)
WBC OTHER # BLD: 10.4 K/UL (ref 3.5–11.3)

## 2025-05-28 PROCEDURE — 83735 ASSAY OF MAGNESIUM: CPT

## 2025-05-28 PROCEDURE — 80048 BASIC METABOLIC PNL TOTAL CA: CPT

## 2025-05-28 PROCEDURE — 2580000003 HC RX 258: Performed by: EMERGENCY MEDICINE

## 2025-05-28 PROCEDURE — 71045 X-RAY EXAM CHEST 1 VIEW: CPT

## 2025-05-28 PROCEDURE — 99285 EMERGENCY DEPT VISIT HI MDM: CPT

## 2025-05-28 PROCEDURE — 85025 COMPLETE CBC W/AUTO DIFF WBC: CPT

## 2025-05-28 PROCEDURE — 93005 ELECTROCARDIOGRAM TRACING: CPT | Performed by: EMERGENCY MEDICINE

## 2025-05-28 RX ORDER — 0.9 % SODIUM CHLORIDE 0.9 %
1000 INTRAVENOUS SOLUTION INTRAVENOUS ONCE
Status: COMPLETED | OUTPATIENT
Start: 2025-05-28 | End: 2025-05-28

## 2025-05-28 RX ADMIN — SODIUM CHLORIDE 1000 ML: 0.9 INJECTION, SOLUTION INTRAVENOUS at 16:05

## 2025-05-28 NOTE — ED PROVIDER NOTES
[facility] EMERGENCY DEPARTMENT  eMERGENCY dEPARTMENT eNCOUnter    Pt Name: oJhn Viera  MRN: [id number]  Birthdate 1995  Date of evaluation: 5/28/25  CHIEF COMPLAINT       Chief Complaint   Patient presents with    Anxiety     Hx of panic attacks.    Dizziness    Alcohol Problem     Pt states he drinks a 6 pack a day. Last drink last night.     HISTORY OF PRESENT ILLNESS   HPI  Patient is a 30-year-old male who presents emergency room secondary to feeling lightheaded at work.  Patient drives a forklift.  Patient was feeling anxious and began to feel lightheaded.  Patient states he felt lightheaded more when he was getting up from the [employer].  Patient denied any associated chest pain shortness of breath nausea vomiting or abdominal pain.  Patient states he did feel somewhat \"jittery\".  Patient states symptoms started after he drank a red bull.  REVIEW OF SYSTEMS     Constitutional: No fever  Eye: No visual changes  Ear/Nose/Mouth/Throat: No sore throat  Respiratory: No shortness of breath  Cardiovascular: No chest pain  Gastrointestinal: No nausea, no vomiting, no diarrhea  Genitourinary: No dysuria  Musculoskeletal: No joint pain  Integumentary: No rash  Neurologic: Dizziness  Psychiatric: Anxiety, no depression  All systems otherwise negative.        PAST MEDICAL HISTORY     Past Medical History:   Diagnosis Date    Wellness examination     no primary care physician    Wellness examination     ortho-Dr Chen-last visit july 2020     SURGICAL HISTORY       Past Surgical History:   Procedure Laterality Date    APPENDECTOMY      CLAVICLE SURGERY Left 07/07/2020    LEFT CLAVICLE OPEN REDUCTION INTERNAL FIXATION      CLAVICLE SURGERY Left 7/7/2020    LEFT CLAVICLE OPEN REDUCTION INTERNAL FIXATION  C-ARM) ACUMED performed by Etienne Chen DO at [facility] OR     CURRENT MEDICATIONS       Previous Medications    IBUPROFEN (ADVIL;MOTRIN) 800 MG TABLET    Take 1 tablet by mouth every 8 hours as needed for Pain

## 2025-05-30 LAB
EKG ATRIAL RATE: 92 BPM
EKG P AXIS: 43 DEGREES
EKG P-R INTERVAL: 132 MS
EKG Q-T INTERVAL: 358 MS
EKG QRS DURATION: 86 MS
EKG QTC CALCULATION (BAZETT): 442 MS
EKG R AXIS: 1 DEGREES
EKG T AXIS: 36 DEGREES
EKG VENTRICULAR RATE: 92 BPM

## 2025-07-09 ENCOUNTER — OFFICE VISIT (OUTPATIENT)
Dept: FAMILY MEDICINE CLINIC | Age: 30
End: 2025-07-09
Payer: COMMERCIAL

## 2025-07-09 VITALS
OXYGEN SATURATION: 98 % | DIASTOLIC BLOOD PRESSURE: 88 MMHG | HEIGHT: 67 IN | BODY MASS INDEX: 31.04 KG/M2 | SYSTOLIC BLOOD PRESSURE: 132 MMHG | HEART RATE: 74 BPM | WEIGHT: 197.8 LBS

## 2025-07-09 DIAGNOSIS — Z11.4 ENCOUNTER FOR SCREENING FOR HIV: ICD-10-CM

## 2025-07-09 DIAGNOSIS — Z13.220 SCREENING, LIPID: ICD-10-CM

## 2025-07-09 DIAGNOSIS — I10 UNCONTROLLED HYPERTENSION: Primary | ICD-10-CM

## 2025-07-09 DIAGNOSIS — Z11.59 NEED FOR HEPATITIS C SCREENING TEST: ICD-10-CM

## 2025-07-09 DIAGNOSIS — F10.10 ALCOHOL ABUSE: ICD-10-CM

## 2025-07-09 DIAGNOSIS — Z13.29 SCREENING FOR THYROID DISORDER: ICD-10-CM

## 2025-07-09 DIAGNOSIS — Z72.0 TOBACCO ABUSE: ICD-10-CM

## 2025-07-09 DIAGNOSIS — E55.9 VITAMIN D DEFICIENCY: ICD-10-CM

## 2025-07-09 DIAGNOSIS — Z13.0 SCREENING, ANEMIA, DEFICIENCY, IRON: ICD-10-CM

## 2025-07-09 PROCEDURE — 3079F DIAST BP 80-89 MM HG: CPT | Performed by: INTERNAL MEDICINE

## 2025-07-09 PROCEDURE — 99203 OFFICE O/P NEW LOW 30 MIN: CPT | Performed by: INTERNAL MEDICINE

## 2025-07-09 PROCEDURE — 3075F SYST BP GE 130 - 139MM HG: CPT | Performed by: INTERNAL MEDICINE

## 2025-07-09 RX ORDER — AMLODIPINE BESYLATE 2.5 MG/1
2.5 TABLET ORAL DAILY
Qty: 30 TABLET | Refills: 1 | Status: SHIPPED | OUTPATIENT
Start: 2025-07-09

## 2025-07-09 SDOH — ECONOMIC STABILITY: FOOD INSECURITY: WITHIN THE PAST 12 MONTHS, YOU WORRIED THAT YOUR FOOD WOULD RUN OUT BEFORE YOU GOT MONEY TO BUY MORE.: NEVER TRUE

## 2025-07-09 SDOH — ECONOMIC STABILITY: FOOD INSECURITY: WITHIN THE PAST 12 MONTHS, THE FOOD YOU BOUGHT JUST DIDN'T LAST AND YOU DIDN'T HAVE MONEY TO GET MORE.: NEVER TRUE

## 2025-07-09 ASSESSMENT — PATIENT HEALTH QUESTIONNAIRE - PHQ9
SUM OF ALL RESPONSES TO PHQ QUESTIONS 1-9: 0
2. FEELING DOWN, DEPRESSED OR HOPELESS: NOT AT ALL
SUM OF ALL RESPONSES TO PHQ QUESTIONS 1-9: 0
1. LITTLE INTEREST OR PLEASURE IN DOING THINGS: NOT AT ALL

## 2025-07-09 NOTE — PROGRESS NOTES
Past Medical History:   Diagnosis Date    Wellness examination     no primary care physician    Wellness examination     ortho-Dr Chen-last visit july 2020      Past Surgical History:   Procedure Laterality Date    APPENDECTOMY      CLAVICLE SURGERY Left 07/07/2020    LEFT CLAVICLE OPEN REDUCTION INTERNAL FIXATION      CLAVICLE SURGERY Left 7/7/2020    LEFT CLAVICLE OPEN REDUCTION INTERNAL FIXATION  C-ARM) ACUMED performed by Etienne Chen DO at [facility] OR       Family History   Problem Relation Age of Onset    High Blood Pressure Mother     Cancer Father        Social History     Tobacco Use    Smoking status: Every Day     Current packs/day: 1.00     Average packs/day: 1 pack/day for 14.5 years (14.5 ttl pk-yrs)     Types: Cigarettes     Start date: 2011     Passive exposure: Never    Smokeless tobacco: Former     Types: Chew   Substance Use Topics    Alcohol use: Yes     Alcohol/week: 12.0 standard drinks of alcohol     Types: 12 Cans of beer per week     Comment: social         Allergies   Allergen Reactions    Amoxicillin Hives and Shortness Of Breath     Prior to Visit Medications    Medication Sig Taking? Authorizing Provider   ibuprofen (ADVIL;MOTRIN) 800 MG tablet Take 1 tablet by mouth every 8 hours as needed for Pain  Patient not taking: Reported on 7/9/2025  Manfred Foreman DO       Review of Systems     Review of Systems   Constitutional:  Negative for fatigue, fever and unexpected weight change.   Respiratory:  Negative for cough, choking, chest tightness, shortness of breath and wheezing.    Cardiovascular:  Negative for chest pain, palpitations and leg swelling.   Gastrointestinal:  Negative for abdominal pain, anal bleeding, blood in stool, constipation, diarrhea, nausea and vomiting.   Endocrine: Negative.    Musculoskeletal:  Negative for joint swelling and myalgias.   Skin: Negative.    Neurological:  Negative for dizziness.   Psychiatric/Behavioral:  Negative for sleep

## 2025-07-09 NOTE — PATIENT INSTRUCTIONS
Your labs have been ordered today as fasting labs - this means you will need to fast overnight for at least 8 hours before you come in to get the blood drawn. You may have water, black tea or coffee without sugar or creamer prior to coming in for labs.   Your lab results will be released to your [employer] account as they are resulted by the lab. I will send you a message regarding your results once I have had a chance to review them, usually within a couple of days, so if you have not heard from me it means I'm either waiting for some results, or that I have not had a moment to review the results.

## 2025-07-10 ENCOUNTER — HOSPITAL ENCOUNTER (OUTPATIENT)
Age: 30
Setting detail: SPECIMEN
Discharge: HOME | End: 2025-07-10

## 2025-07-10 DIAGNOSIS — Z11.4 ENCOUNTER FOR SCREENING FOR HIV: ICD-10-CM

## 2025-07-10 DIAGNOSIS — Z13.220 SCREENING, LIPID: ICD-10-CM

## 2025-07-10 DIAGNOSIS — Z13.0 SCREENING, ANEMIA, DEFICIENCY, IRON: ICD-10-CM

## 2025-07-10 DIAGNOSIS — E55.9 VITAMIN D DEFICIENCY: ICD-10-CM

## 2025-07-10 DIAGNOSIS — Z13.29 SCREENING FOR THYROID DISORDER: ICD-10-CM

## 2025-07-10 DIAGNOSIS — Z11.59 NEED FOR HEPATITIS C SCREENING TEST: ICD-10-CM

## 2025-07-10 DIAGNOSIS — I10 UNCONTROLLED HYPERTENSION: ICD-10-CM

## 2025-07-10 LAB
25(OH)D3 SERPL-MCNC: 22.2 NG/ML (ref 30–100)
ALBUMIN SERPL-MCNC: 4.5 G/DL (ref 3.5–5.2)
ALBUMIN/GLOB SERPL: 1.7 {RATIO} (ref 1–2.5)
ALP SERPL-CCNC: 71 U/L (ref 40–129)
ALT SERPL-CCNC: 19 U/L (ref 10–50)
ANION GAP SERPL CALCULATED.3IONS-SCNC: 11 MMOL/L (ref 9–16)
AST SERPL-CCNC: 18 U/L (ref 10–50)
BASOPHILS # BLD: 0.06 K/UL (ref 0–0.2)
BASOPHILS NFR BLD: 1 % (ref 0–2)
BILIRUB SERPL-MCNC: 0.4 MG/DL (ref 0–1.2)
BUN SERPL-MCNC: 13 MG/DL (ref 6–20)
CALCIUM SERPL-MCNC: 9.7 MG/DL (ref 8.6–10.4)
CHLORIDE SERPL-SCNC: 101 MMOL/L (ref 98–107)
CHOLEST SERPL-MCNC: 178 MG/DL (ref 0–199)
CHOLESTEROL/HDL RATIO: 4.5
CO2 SERPL-SCNC: 24 MMOL/L (ref 20–31)
CREAT SERPL-MCNC: 0.7 MG/DL (ref 0.7–1.2)
EOSINOPHIL # BLD: 0.12 K/UL (ref 0–0.44)
EOSINOPHILS RELATIVE PERCENT: 1 % (ref 1–4)
ERYTHROCYTE [DISTWIDTH] IN BLOOD BY AUTOMATED COUNT: 11.7 % (ref 11.8–14.4)
GFR, ESTIMATED: >90 ML/MIN/1.73M2
GLUCOSE SERPL-MCNC: 101 MG/DL (ref 74–99)
HCT VFR BLD AUTO: 40.6 % (ref 40.7–50.3)
HCV AB SERPL QL IA: NONREACTIVE
HDLC SERPL-MCNC: 40 MG/DL
HGB BLD-MCNC: 14.2 G/DL (ref 13–17)
HIV 1+2 AB+HIV1 P24 AG SERPL QL IA: NONREACTIVE
IMM GRANULOCYTES # BLD AUTO: 0.03 K/UL (ref 0–0.3)
IMM GRANULOCYTES NFR BLD: 0 %
LDLC SERPL CALC-MCNC: 125 MG/DL (ref 0–100)
LYMPHOCYTES NFR BLD: 1.3 K/UL (ref 1.1–3.7)
LYMPHOCYTES RELATIVE PERCENT: 14 % (ref 24–43)
MCH RBC QN AUTO: 33 PG (ref 25.2–33.5)
MCHC RBC AUTO-ENTMCNC: 35 G/DL (ref 28.4–34.8)
MCV RBC AUTO: 94.4 FL (ref 82.6–102.9)
MONOCYTES NFR BLD: 0.63 K/UL (ref 0.1–1.2)
MONOCYTES NFR BLD: 7 % (ref 3–12)
NEUTROPHILS NFR BLD: 77 % (ref 36–65)
NEUTS SEG NFR BLD: 6.95 K/UL (ref 1.5–8.1)
NRBC BLD-RTO: 0 PER 100 WBC
PLATELET # BLD AUTO: 314 K/UL (ref 138–453)
PMV BLD AUTO: 9.5 FL (ref 8.1–13.5)
POTASSIUM SERPL-SCNC: 4.2 MMOL/L (ref 3.7–5.3)
PROT SERPL-MCNC: 7.1 G/DL (ref 6.6–8.7)
RBC # BLD AUTO: 4.3 M/UL (ref 4.21–5.77)
SODIUM SERPL-SCNC: 136 MMOL/L (ref 136–145)
TRIGL SERPL-MCNC: 64 MG/DL (ref 0–149)
TSH SERPL DL<=0.05 MIU/L-ACNC: 1.24 UIU/ML (ref 0.27–4.2)
VLDLC SERPL CALC-MCNC: 13 MG/DL (ref 1–30)
WBC OTHER # BLD: 9.1 K/UL (ref 3.5–11.3)

## 2025-07-17 ASSESSMENT — ENCOUNTER SYMPTOMS
CONSTIPATION: 0
ABDOMINAL PAIN: 0
DIARRHEA: 0
NAUSEA: 0
COUGH: 0
ANAL BLEEDING: 0
SHORTNESS OF BREATH: 0
BLOOD IN STOOL: 0
CHOKING: 0
CHEST TIGHTNESS: 0
WHEEZING: 0
VOMITING: 0

## 2025-07-17 ASSESSMENT — VISUAL ACUITY: OU: 1

## 2025-08-05 ENCOUNTER — OFFICE VISIT (OUTPATIENT)
Dept: FAMILY MEDICINE CLINIC | Age: 30
End: 2025-08-05
Payer: COMMERCIAL

## 2025-08-05 VITALS
BODY MASS INDEX: 31.2 KG/M2 | WEIGHT: 199.2 LBS | HEART RATE: 85 BPM | DIASTOLIC BLOOD PRESSURE: 82 MMHG | SYSTOLIC BLOOD PRESSURE: 130 MMHG | OXYGEN SATURATION: 99 %

## 2025-08-05 DIAGNOSIS — E66.09 CLASS 1 OBESITY DUE TO EXCESS CALORIES WITH SERIOUS COMORBIDITY AND BODY MASS INDEX (BMI) OF 31.0 TO 31.9 IN ADULT: ICD-10-CM

## 2025-08-05 DIAGNOSIS — I10 PRIMARY HYPERTENSION: Primary | ICD-10-CM

## 2025-08-05 DIAGNOSIS — E66.811 CLASS 1 OBESITY DUE TO EXCESS CALORIES WITH SERIOUS COMORBIDITY AND BODY MASS INDEX (BMI) OF 31.0 TO 31.9 IN ADULT: ICD-10-CM

## 2025-08-05 PROCEDURE — 99214 OFFICE O/P EST MOD 30 MIN: CPT | Performed by: INTERNAL MEDICINE

## 2025-08-05 PROCEDURE — 3079F DIAST BP 80-89 MM HG: CPT | Performed by: INTERNAL MEDICINE

## 2025-08-05 PROCEDURE — 3075F SYST BP GE 130 - 139MM HG: CPT | Performed by: INTERNAL MEDICINE

## 2025-08-05 RX ORDER — AMLODIPINE BESYLATE 2.5 MG/1
2.5 TABLET ORAL DAILY
Qty: 90 TABLET | Refills: 1 | Status: SHIPPED | OUTPATIENT
Start: 2025-08-05

## (undated) DEVICE — SUTURE MCRYL SZ 3-0 L27IN ABSRB UD L24MM PS-1 3/8 CIR PRIM Y936H

## (undated) DEVICE — GARMENT,MEDLINE,DVT,INT,CALF,MED, GEN2: Brand: MEDLINE

## (undated) DEVICE — SUTURE VCRL SZ 0 L27IN ABSRB UD L36MM CT-1 1/2 CIR J260H

## (undated) DEVICE — NEEDLE HYPO 25GA L1.5IN BLU POLYPR HUB S STL REG BVL STR

## (undated) DEVICE — NEEDLE SYR 18GA L1.5IN RED PLAS HUB S STL BLNT FILL W/O

## (undated) DEVICE — APPLICATOR MEDICATED 26 CC SOLUTION HI LT ORNG CHLORAPREP

## (undated) DEVICE — SLING ARM L L17 1/2IN D8.5IN COT POLY DLX W/ SHLDR PD

## (undated) DEVICE — APPLICATOR MEDICATED 3 CC SOLUTION HI LT ORNG CHLORAPREP

## (undated) DEVICE — GOWN,AURORA,NONRNF,XL,30/CS: Brand: MEDLINE

## (undated) DEVICE — 1000 S-DRAPE TOWEL DRAPE 10/BX: Brand: STERI-DRAPE™

## (undated) DEVICE — Device

## (undated) DEVICE — GLOVE ORANGE PI 8   MSG9080

## (undated) DEVICE — DRAPE,U/ SHT,SPLIT,PLAS,STERIL: Brand: MEDLINE

## (undated) DEVICE — GEL US 20GM NONIRRITATING OVERWRAPPED FILE PCH TRNSMIT

## (undated) DEVICE — DRESSING FOAM W4XL10IN AG SIL ADH ANTIMIC POSTOP OPTIFOAM

## (undated) DEVICE — ADHESIVE SKIN CLSR 0.7ML TOP DERMBND ADV

## (undated) DEVICE — GLOVE ORANGE PI 7 1/2   MSG9075

## (undated) DEVICE — NEEDLE ECHOGENIC 20GA L4IN INSUL W/ 30DEG BVL EXTN SET

## (undated) DEVICE — SUTURE VCRL SZ 2-0 L27IN ABSRB UD L22MM X-1 1/2 CIR REV CUT J459H

## (undated) DEVICE — STOCKINETTE,IMPERVIOUS,12X48,STERILE: Brand: MEDLINE

## (undated) DEVICE — SVMMC ORTH SPL DRP PK

## (undated) DEVICE — SUTURE STRATAFIX SPRL SZ 3-0 L5IN ABSRB UD FS L26MM 3/8 CIR SXMP2B411

## (undated) DEVICE — 3M™ IOBAN™ 2 ANTIMICROBIAL INCISE DRAPE 6650EZ: Brand: IOBAN™ 2